# Patient Record
Sex: FEMALE
[De-identification: names, ages, dates, MRNs, and addresses within clinical notes are randomized per-mention and may not be internally consistent; named-entity substitution may affect disease eponyms.]

---

## 2018-02-11 ENCOUNTER — HOSPITAL ENCOUNTER (EMERGENCY)
Dept: HOSPITAL 14 - H.ER | Age: 58
LOS: 1 days | Discharge: HOME | End: 2018-02-12
Payer: COMMERCIAL

## 2018-02-11 VITALS — HEART RATE: 83 BPM

## 2018-02-11 VITALS
DIASTOLIC BLOOD PRESSURE: 82 MMHG | OXYGEN SATURATION: 99 % | SYSTOLIC BLOOD PRESSURE: 148 MMHG | RESPIRATION RATE: 18 BRPM | TEMPERATURE: 98.8 F

## 2018-02-11 DIAGNOSIS — B34.9: Primary | ICD-10-CM

## 2018-02-11 DIAGNOSIS — Z79.82: ICD-10-CM

## 2018-02-11 LAB
BASOPHILS # BLD AUTO: 0 K/UL (ref 0–0.2)
BASOPHILS NFR BLD: 0.3 % (ref 0–2)
BUN SERPL-MCNC: 11 MG/DL (ref 7–17)
CALCIUM SERPL-MCNC: 9.6 MG/DL (ref 8.4–10.2)
EOSINOPHIL # BLD AUTO: 0.1 K/UL (ref 0–0.7)
EOSINOPHIL NFR BLD: 1 % (ref 0–4)
ERYTHROCYTE [DISTWIDTH] IN BLOOD BY AUTOMATED COUNT: 13.9 % (ref 11.5–14.5)
GFR NON-AFRICAN AMERICAN: > 60
HGB BLD-MCNC: 13.7 G/DL (ref 12–16)
LYMPHOCYTES # BLD AUTO: 2.6 K/UL (ref 1–4.3)
LYMPHOCYTES NFR BLD AUTO: 24.8 % (ref 20–40)
MAGNESIUM SERPL-MCNC: 2.1 MG/DL (ref 1.6–2.3)
MCH RBC QN AUTO: 28.1 PG (ref 27–31)
MCHC RBC AUTO-ENTMCNC: 33.6 G/DL (ref 33–37)
MCV RBC AUTO: 83.5 FL (ref 81–99)
MONOCYTES # BLD: 0.8 K/UL (ref 0–0.8)
MONOCYTES NFR BLD: 7.6 % (ref 0–10)
NEUTROPHILS # BLD: 6.8 K/UL (ref 1.8–7)
NEUTROPHILS NFR BLD AUTO: 66.3 % (ref 50–75)
NRBC BLD AUTO-RTO: 0.1 % (ref 0–0)
PLATELET # BLD: 313 K/UL (ref 130–400)
PMV BLD AUTO: 7.6 FL (ref 7.2–11.7)
RBC # BLD AUTO: 4.89 MIL/UL (ref 3.8–5.2)
WBC # BLD AUTO: 10.3 K/UL (ref 4.8–10.8)

## 2018-02-11 PROCEDURE — 87070 CULTURE OTHR SPECIMN AEROBIC: CPT

## 2018-02-11 PROCEDURE — 71046 X-RAY EXAM CHEST 2 VIEWS: CPT

## 2018-02-11 PROCEDURE — 93005 ELECTROCARDIOGRAM TRACING: CPT

## 2018-02-11 PROCEDURE — 87804 INFLUENZA ASSAY W/OPTIC: CPT

## 2018-02-11 PROCEDURE — 85378 FIBRIN DEGRADE SEMIQUANT: CPT

## 2018-02-11 PROCEDURE — 99282 EMERGENCY DEPT VISIT SF MDM: CPT

## 2018-02-11 PROCEDURE — 84484 ASSAY OF TROPONIN QUANT: CPT

## 2018-02-11 PROCEDURE — 87430 STREP A AG IA: CPT

## 2018-02-11 PROCEDURE — 83735 ASSAY OF MAGNESIUM: CPT

## 2018-02-11 PROCEDURE — 71275 CT ANGIOGRAPHY CHEST: CPT

## 2018-02-11 PROCEDURE — 85025 COMPLETE CBC W/AUTO DIFF WBC: CPT

## 2018-02-11 PROCEDURE — 80048 BASIC METABOLIC PNL TOTAL CA: CPT

## 2018-02-11 NOTE — ED PDOC
HPI: Chest Pain


Time Seen by Provider: 18 17:22


Chief Complaint (Nursing): Cough, Cold, Congestion


Chief Complaint (Provider): Chest Pain


History Per: Patient


History/Exam Limitations: no limitations


Onset/Duration Of Symptoms: Days (x4 weeks)


Current Symptoms Are (Timing): Still Present


Severity: None


Exacerbating Factors: Deep Breathing, Exertion


Alleviating Factors: None


Additional Complaint(s): 


57 year old female presents to the ED complaining of intermittent chest pain 

and cough. The patient also states that she had had an intermittent cough for 4 

weeks with alot of force. She states that the chest pain occurs with deep 

inspiration or exertion.





PMD: FAMILY PROVIDER,NO





Past Medical History


Reviewed: Historical Data, Nursing Documentation, Vital Signs


Vital Signs: 


 Last Vital Signs











Temp  98.8 F   18 16:33


 


Pulse  83   18 17:22


 


Resp  18   18 16:33


 


BP  148/82   18 16:33


 


Pulse Ox  99   18 17:47














- Medical History


PMH: No Chronic Diseases





- Surgical History


Surgical History: No Surg Hx





- Family History


Family History: States: Unknown Family Hx





- Living Arrangements


Living Arrangements: With Family





- Home Medications


Home Medications: 


 Ambulatory Orders











 Medication  Instructions  Recorded


 


Promethazine/Codeine 5 ml PO Q12 PRN #100 ml 18





[Codeine/Promethazine 10 MG/5  





Ml-6.25 MG/5 Ml]  














- Allergies


Allergies/Adverse Reactions: 


 Allergies











Allergy/AdvReac Type Severity Reaction Status Date / Time


 


No Known Allergies Allergy   Verified 18 16:33














Review of Systems


Cardiovascular: Positive for: Chest Pain


Respiratory: Positive for: Cough





Physical Exam





- Physical Exam


Appears: Positive for: Non-toxic, No Acute Distress


Head Exam: Positive for: NORMAL INSPECTION


Skin: Positive for: Normal Color, Warm, Dry.  Negative for: Rash


ENT: Positive for: Other (mild throat irritation).  Negative for: Nasal 

Congestion, Tonsillar Exudate, Tonsillar Swelling


Neck: Positive for: Normal, Painless ROM, Supple


Cardiovascular/Chest: Positive for: Regular Rate, Rhythm, Chest Non Tender.  

Negative for: Tachycardia


Respiratory: Positive for: Normal Breath Sounds.  Negative for: Rales, Rhonchi, 

Wheezing, Respiratory Distress


Gastrointestinal/Abdominal: Positive for: Normal Exam, Bowel Sounds, Soft.  

Negative for: Tenderness, Guarding


Back: Positive for: Normal Inspection.  Negative for: L CVA Tenderness, R CVA 

Tenderness


Extremity: Positive for: Normal ROM.  Negative for: Tenderness, Deformity, 

Swelling


Neurologic/Psych: Positive for: Alert, Oriented





- Laboratory Results


Result Diagrams: 


 18 17:49





 18 17:50





- ECG


O2 Sat by Pulse Oximetry: 99 (RA)


Pulse Ox Interpretation: Normal





- Progress


ED Course And Treament: 





CXR: NO ACUTE DISEASE





EKG REVIEWED WITH DR. FRIED NO ACUTE FINDINGS





 MG X 1 DOSE





REPEAT TROPININ AFTER 3 HOURS /D -DIMER





D-DIMER NOTED ELEVATED 





CTA CHEST: NO PE/ NO PNEUMONIA





INFLUENZA A/B NEG





Medical Decision Making


Medical Decision Makin


Initial Impression


57 year old female presenting with cough and chest pain





Initial Plan:


* EKG 


* BMP 


* Magnesium 


* Troponin 


* CBC 


* CXR 


* Aspirin 324mg PO 


* Influenza A B 


* Rapid strep group


* Reevaluation





________________________________________________________________________________

_______


Documented by Jigna Mcgill acting as a scribe for Frank Bowden PA-C.





All medical record entries made by the Scribe were at my direction and 

personally dictated by me. I have reviewed the chart and agree that the record 

accurately reflects my personal performance of the history, physical exam, 

medical decision making, and the department course for this patient. I have 

also personally directed, reviewed, and agree with the discharge instructions 

and disposition. 




















Disposition





- Clinical Impression


Clinical Impression: 


 Viral illness








- Patient ED Disposition


Is Patient to be Admitted: No





- Disposition


Referrals: 


[facility] [Outside]


Disposition: Routine/Home


Disposition Time: 23:45


Condition: FAIR


Prescriptions: 


Promethazine/Codeine [Codeine/Promethazine 10 MG/5 Ml-6.25 MG/5 Ml] 5 ml PO Q12 

PRN #100 ml


 PRN Reason: Cough


Instructions:  Viral Syndrome (ED)


Forms:  [facility] (English), [facility] ED School/Work Excuse

## 2018-02-11 NOTE — CT
EXAM:

  CT Angiography Chest With Intravenous Contrast



EXAM DATE/TIME:

  2/11/2018 9:50 PM



CLINICAL HISTORY:

  57 years old, female; Signs and symptoms; Cough; Symptoms not specified; 

Additional info: R/O pe



TECHNIQUE:

  Axial computed tomographic angiography images of the chest with intravenous 

contrast using pulmonary embolism protocol.  All CT scans at this facility use 

one or more dose reduction techniques, viz.: automated exposure control; ma/kV 

adjustment per patient size (including targeted exams where dose is matched to 

indication; i.e. head); or iterative reconstruction technique.

  MIP reconstructed images were created and reviewed.

  Coronal and sagittal reformatted images were created and reviewed.



CONTRAST:

  90 mL of [id number] administered intravenously.



COMPARISON:

  CR - CHEST TWO VIEWS (PA/LAT) 2018-02-11 17:25



FINDINGS:

  Heart aorta and Pulmonary arteries:  Heart size is normal. There is fluid in 

pericardial recesses.There is no aneurysm or dissection.There are vascular 

calcifications. Main pulmonary artery is mildly prominent 37 m in diameter. 

There are no pulmonary emboli.

  

  Lungs and pleural spaces:  Trachea and main bronchi are patent.There is no 

pneumothorax. There is minimal apical scarring. There is no lobar or segmental 

consolidation. There is minimal groundglass opacity in the lingula. There is 

subsegmental atelectasis/scarring at the left base. There is dependent 

atelectasis bilaterally. There is minimal scarring at the lung bases. There are 

no effusions.

  

  Mediastinum:  The esophagus is unremarkable. There is a small hiatal hernia. 

There are no pathologically enlarged mediastinal or hilar nodes.

  Thyroid:  Thyroid is not optimally demonstrated.

  Bones/joints:  There are no acute osseous abnormalities.

  Soft tissues:  unremarkable

  

  Upper abdomen:  There are no acute abnormalities in the visualized portion of 

the abdomen.



IMPRESSION:  No aneurysm, dissection or pulmonary embolus; mildly prominent 

main pulmonary artery suggest pulmonary hypertension; no focal pneumonia

## 2018-02-11 NOTE — RAD
HISTORY:

cp  



COMPARISON:

No prior.



TECHNIQUE:

Chest PA and lateral



FINDINGS:



LUNGS:

No active pulmonary disease.



PLEURA:

No significant pleural effusion identified. No pneumothorax apparent.



CARDIOVASCULAR:

Normal.



OSSEOUS STRUCTURES:

No significant abnormalities.



VISUALIZED UPPER ABDOMEN:

Normal.



OTHER FINDINGS:

None.



IMPRESSION:

No active disease.

## 2018-02-12 NOTE — CARD
--------------- APPROVED REPORT --------------





EKG Measurement

Heart [id number]

[id number]

[id number]

[id number]

[id number]



<Conclusion>

Normal sinus rhythm

Normal ECG